# Patient Record
Sex: MALE | Race: WHITE | NOT HISPANIC OR LATINO | Employment: FULL TIME | URBAN - NONMETROPOLITAN AREA
[De-identification: names, ages, dates, MRNs, and addresses within clinical notes are randomized per-mention and may not be internally consistent; named-entity substitution may affect disease eponyms.]

---

## 2023-03-07 PROBLEM — J32.9 CHRONIC SINUSITIS: Status: ACTIVE | Noted: 2023-03-07

## 2023-03-07 PROBLEM — L23.7 CONTACT DERMATITIS DUE TO POISON IVY: Status: ACTIVE | Noted: 2023-03-07

## 2023-03-07 PROBLEM — L70.0 ACNE VULGARIS: Status: ACTIVE | Noted: 2023-03-07

## 2023-03-07 PROBLEM — J35.02 CHRONIC ADENOIDITIS: Status: ACTIVE | Noted: 2023-03-07

## 2023-03-07 PROBLEM — L25.5 CONTACT DERMATITIS DUE TO PLANT: Status: ACTIVE | Noted: 2023-03-07

## 2023-03-07 PROBLEM — J30.9 ALLERGIC RHINITIS: Status: ACTIVE | Noted: 2023-03-07

## 2023-03-07 PROBLEM — M25.569 KNEE PAIN: Status: ACTIVE | Noted: 2023-03-07

## 2023-03-07 PROBLEM — B08.1 MOLLUSCUM CONTAGIOSUM: Status: ACTIVE | Noted: 2023-03-07
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
## 2023-03-07 PROBLEM — M54.50 ACUTE LOW BACK PAIN: Status: ACTIVE | Noted: 2023-03-07

## 2023-03-07 PROBLEM — J02.9 SORE THROAT: Status: ACTIVE | Noted: 2023-03-07

## 2023-03-07 PROBLEM — R51.9 HEADACHE: Status: ACTIVE | Noted: 2023-03-07

## 2023-03-07 PROBLEM — J34.89 CONCHA BULLOSA: Status: ACTIVE | Noted: 2023-03-07

## 2023-03-07 PROBLEM — S61.219A LACERATION OF FINGER: Status: ACTIVE | Noted: 2023-03-07

## 2023-03-07 PROBLEM — R09.82 POSTNASAL DRIP: Status: ACTIVE | Noted: 2023-03-07

## 2023-03-07 PROBLEM — G44.209 MUSCLE TENSION HEADACHE: Status: ACTIVE | Noted: 2023-03-07

## 2023-03-07 PROBLEM — B07.8 COMMON WART: Status: ACTIVE | Noted: 2023-03-07

## 2023-03-07 PROBLEM — J30.1 ACUTE ALLERGIC RHINITIS DUE TO POLLEN: Status: ACTIVE | Noted: 2023-03-07

## 2023-03-07 RX ORDER — AZELASTINE 1 MG/ML
2 SPRAY, METERED NASAL 2 TIMES DAILY
COMMUNITY
Start: 2022-05-13

## 2023-03-07 RX ORDER — BENZOCAINE .13; .15; .5; 2 G/100G; G/100G; G/100G; G/100G
2 GEL ORAL DAILY
COMMUNITY
Start: 2022-05-13

## 2023-03-07 RX ORDER — EPINEPHRINE 0.3 MG/.3ML
0.3 INJECTION SUBCUTANEOUS
COMMUNITY
Start: 2022-05-13

## 2023-03-10 ENCOUNTER — OFFICE VISIT (OUTPATIENT)
Dept: PRIMARY CARE | Facility: CLINIC | Age: 23
End: 2023-03-10
Payer: COMMERCIAL

## 2023-03-10 VITALS
HEART RATE: 79 BPM | WEIGHT: 164.4 LBS | BODY MASS INDEX: 21.69 KG/M2 | SYSTOLIC BLOOD PRESSURE: 122 MMHG | DIASTOLIC BLOOD PRESSURE: 73 MMHG

## 2023-03-10 DIAGNOSIS — Z11.3 ROUTINE SCREENING FOR STI (SEXUALLY TRANSMITTED INFECTION): ICD-10-CM

## 2023-03-10 DIAGNOSIS — Z00.00 HEALTH MAINTENANCE EXAMINATION: Primary | ICD-10-CM

## 2023-03-10 PROCEDURE — 87491 CHLMYD TRACH DNA AMP PROBE: CPT

## 2023-03-10 PROCEDURE — 99395 PREV VISIT EST AGE 18-39: CPT | Performed by: FAMILY MEDICINE

## 2023-03-10 PROCEDURE — 87591 N.GONORRHOEAE DNA AMP PROB: CPT

## 2023-03-10 PROCEDURE — 1036F TOBACCO NON-USER: CPT | Performed by: FAMILY MEDICINE

## 2023-03-10 PROCEDURE — 87661 TRICHOMONAS VAGINALIS AMPLIF: CPT

## 2023-03-10 ASSESSMENT — ENCOUNTER SYMPTOMS
FEVER: 0
TROUBLE SWALLOWING: 0
HEMATURIA: 0
DIARRHEA: 0
BLOOD IN STOOL: 0
SEIZURES: 0
JOINT SWELLING: 0
COLOR CHANGE: 0
CONFUSION: 0
PALPITATIONS: 0
NERVOUS/ANXIOUS: 0
SHORTNESS OF BREATH: 0
DIFFICULTY URINATING: 0
UNEXPECTED WEIGHT CHANGE: 0
CONSTIPATION: 0
APPETITE CHANGE: 0

## 2023-03-10 NOTE — PROGRESS NOTES
Subjective   Patient ID: Calvin Monroy is a 22 y.o. male who presents for No chief complaint on file..  HPI  Patient would like an STI test today.  He states that he was with a new partner about 3 weeks ago and would like testing. He denies any symptoms.   He states that he sometimes uses condoms but not consistently.   Denies dysuria, increased frequency, hematuria, discharge, burning sensation, or puritis.     Patient also states that possibly had a cold sore or pimple on upper lip that has since went away.  Denies any pain.     Review of Systems   Constitutional:  Negative for appetite change, fever and unexpected weight change.   HENT:  Negative for congestion and trouble swallowing.    Eyes:  Negative for visual disturbance.   Respiratory:  Negative for shortness of breath.    Cardiovascular:  Negative for chest pain, palpitations and leg swelling.   Gastrointestinal:  Negative for blood in stool, constipation and diarrhea.   Genitourinary:  Negative for difficulty urinating and hematuria.   Musculoskeletal:  Negative for gait problem and joint swelling.   Skin:  Negative for color change.   Allergic/Immunologic: Negative for immunocompromised state.   Neurological:  Negative for seizures and syncope.   Psychiatric/Behavioral:  Negative for confusion and suicidal ideas. The patient is not nervous/anxious.        Objective   /73   Pulse 79   Wt 74.6 kg (164 lb 6.4 oz)   BMI 21.69 kg/m²     Physical Exam  Constitutional:       General: He is not in acute distress.     Appearance: Normal appearance. He is not ill-appearing.   HENT:      Head: Normocephalic and atraumatic.      Right Ear: Tympanic membrane normal.      Left Ear: Tympanic membrane normal.      Nose: Nose normal.      Mouth/Throat:      Mouth: Mucous membranes are moist.   Eyes:      Pupils: Pupils are equal, round, and reactive to light.   Cardiovascular:      Rate and Rhythm: Normal rate and regular rhythm.      Heart sounds: No murmur  heard.     No friction rub. No gallop.   Pulmonary:      Effort: Pulmonary effort is normal.      Breath sounds: Normal breath sounds.   Abdominal:      General: Abdomen is flat. There is no distension.      Palpations: Abdomen is soft.      Tenderness: There is no abdominal tenderness. There is no guarding.      Hernia: No hernia is present.   Musculoskeletal:         General: Normal range of motion.   Skin:     General: Skin is warm and dry.   Neurological:      General: No focal deficit present.      Mental Status: He is alert. Mental status is at baseline.      Cranial Nerves: No cranial nerve deficit.      Motor: No weakness.      Gait: Gait normal.   Psychiatric:         Mood and Affect: Mood normal.         Behavior: Behavior normal.         Thought Content: Thought content normal.         Judgment: Judgment normal.           Assessment/Plan   Problem List Items Addressed This Visit    None  Visit Diagnoses       Health maintenance examination    -  Primary    Routine screening for STI (sexually transmitted infection)

## 2023-03-11 LAB
CHLAMYDIA TRACH., AMPLIFIED: NEGATIVE
N. GONORRHEA, AMPLIFIED: NEGATIVE

## 2023-03-12 LAB — TRICHOMONAS VAGINALIS: NEGATIVE

## 2024-01-10 ENCOUNTER — HOSPITAL ENCOUNTER (EMERGENCY)
Facility: HOSPITAL | Age: 24
Discharge: HOME | End: 2024-01-10
Attending: EMERGENCY MEDICINE
Payer: MEDICARE

## 2024-01-10 ENCOUNTER — APPOINTMENT (OUTPATIENT)
Dept: RADIOLOGY | Facility: HOSPITAL | Age: 24
End: 2024-01-10
Payer: MEDICARE

## 2024-01-10 VITALS
RESPIRATION RATE: 18 BRPM | OXYGEN SATURATION: 100 % | TEMPERATURE: 97.3 F | HEIGHT: 72 IN | SYSTOLIC BLOOD PRESSURE: 131 MMHG | WEIGHT: 170 LBS | BODY MASS INDEX: 23.03 KG/M2 | DIASTOLIC BLOOD PRESSURE: 79 MMHG | HEART RATE: 71 BPM

## 2024-01-10 DIAGNOSIS — M62.830 PARASPINAL MUSCLE SPASM: Primary | ICD-10-CM

## 2024-01-10 PROCEDURE — 99285 EMERGENCY DEPT VISIT HI MDM: CPT | Mod: 25 | Performed by: EMERGENCY MEDICINE

## 2024-01-10 PROCEDURE — 72128 CT CHEST SPINE W/O DYE: CPT | Mod: FOREIGN READ | Performed by: RADIOLOGY

## 2024-01-10 PROCEDURE — 72128 CT CHEST SPINE W/O DYE: CPT | Mod: FR

## 2024-01-10 PROCEDURE — 72125 CT NECK SPINE W/O DYE: CPT

## 2024-01-10 PROCEDURE — 72125 CT NECK SPINE W/O DYE: CPT | Performed by: RADIOLOGY

## 2024-01-10 ASSESSMENT — COLUMBIA-SUICIDE SEVERITY RATING SCALE - C-SSRS
6. HAVE YOU EVER DONE ANYTHING, STARTED TO DO ANYTHING, OR PREPARED TO DO ANYTHING TO END YOUR LIFE?: NO
1. IN THE PAST MONTH, HAVE YOU WISHED YOU WERE DEAD OR WISHED YOU COULD GO TO SLEEP AND NOT WAKE UP?: NO
2. HAVE YOU ACTUALLY HAD ANY THOUGHTS OF KILLING YOURSELF?: NO

## 2024-01-10 ASSESSMENT — PAIN DESCRIPTION - DESCRIPTORS: DESCRIPTORS: ACHING

## 2024-01-10 ASSESSMENT — LIFESTYLE VARIABLES
EVER HAD A DRINK FIRST THING IN THE MORNING TO STEADY YOUR NERVES TO GET RID OF A HANGOVER: NO
EVER FELT BAD OR GUILTY ABOUT YOUR DRINKING: NO
REASON UNABLE TO ASSESS: NO
HAVE YOU EVER FELT YOU SHOULD CUT DOWN ON YOUR DRINKING: NO
HAVE PEOPLE ANNOYED YOU BY CRITICIZING YOUR DRINKING: NO

## 2024-01-10 ASSESSMENT — PAIN - FUNCTIONAL ASSESSMENT: PAIN_FUNCTIONAL_ASSESSMENT: 0-10

## 2024-01-10 ASSESSMENT — PAIN DESCRIPTION - LOCATION: LOCATION: NECK

## 2024-01-10 ASSESSMENT — PAIN DESCRIPTION - ORIENTATION: ORIENTATION: POSTERIOR

## 2024-01-10 ASSESSMENT — PAIN DESCRIPTION - PAIN TYPE: TYPE: ACUTE PAIN

## 2024-01-10 ASSESSMENT — PAIN SCALES - GENERAL: PAINLEVEL_OUTOF10: 5 - MODERATE PAIN
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
## 2024-06-21 ENCOUNTER — OFFICE VISIT (OUTPATIENT)
Dept: PRIMARY CARE | Facility: CLINIC | Age: 24
End: 2024-06-21
Payer: COMMERCIAL

## 2024-06-21 ENCOUNTER — TELEPHONE (OUTPATIENT)
Dept: PRIMARY CARE | Facility: CLINIC | Age: 24
End: 2024-06-21

## 2024-06-21 VITALS
SYSTOLIC BLOOD PRESSURE: 128 MMHG | WEIGHT: 165 LBS | DIASTOLIC BLOOD PRESSURE: 78 MMHG | BODY MASS INDEX: 22.38 KG/M2 | OXYGEN SATURATION: 98 % | HEART RATE: 92 BPM

## 2024-06-21 DIAGNOSIS — L23.7 POISON IVY DERMATITIS: Primary | ICD-10-CM

## 2024-06-21 PROCEDURE — 1036F TOBACCO NON-USER: CPT

## 2024-06-21 PROCEDURE — 99213 OFFICE O/P EST LOW 20 MIN: CPT

## 2024-06-21 RX ORDER — PREDNISONE 20 MG/1
40 TABLET ORAL DAILY
Qty: 10 TABLET | Refills: 0 | Status: SHIPPED | OUTPATIENT
Start: 2024-06-21 | End: 2024-06-26

## 2024-06-21 RX ORDER — TRIAMCINOLONE ACETONIDE 1 MG/G
OINTMENT TOPICAL 2 TIMES DAILY PRN
Qty: 30 G | Refills: 0 | Status: SHIPPED | OUTPATIENT
Start: 2024-06-21 | End: 2025-06-21

## 2024-06-21 NOTE — TELEPHONE ENCOUNTER
Needs appt to get script for poison ivy- he has not been in for over a year, Vinnie said tomorrow is okay

## 2024-06-21 NOTE — PROGRESS NOTES
Subjective   Patient ID: Calvin Monroy is a 23 y.o. male who presents for Poison Kath.  HPI  Calvin presents for poison ivy   -A day or two ago the rash started   -Has been around poison ivy, is allergic to it   -Has the rash on his L leg   -It is itchy   -Spreading up his legs   -No fever or chills   -Tried ivy dry   -No one else has the rash at home   -No changes in soaps, lotions, or laundry detergent  -No recent travel   -Has used prednisone in the past and it has helped       Past Surgical History:   Procedure Laterality Date    ADENOIDECTOMY  04/15/2014    Adenoidectomy    OTHER SURGICAL HISTORY  03/21/2022    Myringotomy with tube placement    OTHER SURGICAL HISTORY  07/29/2014    Tympanoplasty    TONSILLECTOMY  04/15/2014    Tonsillectomy      Past Medical History:   Diagnosis Date    Central perforation of tympanic membrane, unspecified ear 10/24/2013    Central perforation of tympanic membrane    Conductive hearing loss, unilateral, right ear, with unrestricted hearing on the contralateral side 04/15/2014    Conductive hearing loss in right ear    Other acute sinusitis 02/02/2016    Other acute sinusitis    Otitis media, unspecified, unspecified ear     Ear infection    Personal history of other diseases of the respiratory system 04/03/2017    History of influenza    Snoring     Snoring    Unspecified hearing loss, unspecified ear     Hearing problem     Social History     Tobacco Use    Smoking status: Never    Smokeless tobacco: Never   Vaping Use    Vaping status: Every Day    Substances: Nicotine   Substance Use Topics    Alcohol use: Yes     Alcohol/week: 6.0 - 12.0 standard drinks of alcohol     Types: 6 - 12 Cans of beer per week    Drug use: Not Currently     Comment: Quit marijuana a few months ago        Review of Systems  10 point review of systems performed and is negative except as noted in the HPI.      Current Outpatient Medications:     EPINEPHrine (Auvi-Q) 0.3 mg/0.3 mL injection  syringe, Inject 0.3 mL (0.3 mg) as directed. As Directed, Disp: , Rfl:     azelastine (Astelin) 137 mcg (0.1 %) nasal spray, Administer 2 sprays into each nostril 2 times a day., Disp: , Rfl:     budesonide (Rhinocort AQ) 32 mcg/actuation nasal spray, Administer 2 sprays into each nostril once daily., Disp: , Rfl:     predniSONE (Deltasone) 20 mg tablet, Take 2 tablets (40 mg) by mouth once daily for 5 days., Disp: 10 tablet, Rfl: 0    triamcinolone (Kenalog) 0.1 % ointment, Apply topically 2 times a day as needed for rash., Disp: 30 g, Rfl: 0     Objective   /78   Pulse 92   Wt 74.8 kg (165 lb)   SpO2 98%   BMI 22.38 kg/m²     Physical Exam  Constitutional:       General: He is not in acute distress.     Appearance: Normal appearance. He is not toxic-appearing.   HENT:      Head: Normocephalic and atraumatic.   Cardiovascular:      Rate and Rhythm: Normal rate and regular rhythm.      Heart sounds: Normal heart sounds.   Pulmonary:      Effort: Pulmonary effort is normal.      Breath sounds: Normal breath sounds. No wheezing, rhonchi or rales.   Skin:     Findings: Rash present.      Comments: Erythematous papules on L leg - most seen on lower leg but a few appear on thigh. No additional papules seen on R leg or arms.    Neurological:      Mental Status: He is alert and oriented to person, place, and time.         Assessment/Plan   Problem List Items Addressed This Visit    None  Visit Diagnoses       Poison ivy dermatitis    -  Primary    Relevant Medications    -predniSONE (Deltasone) 20 mg tablet    -gave triamcinolone (Kenalog) 0.1 % ointment as well, patient encounters poison ivy often           Discussed at visit any disease processes that were of concern as well as the risks, benefits and instructions on any new medication provided. Patient (and/or caretaker of patient if present) stated all questions were answered, and they voiced understanding of instructions.

## 2025-03-07 ENCOUNTER — OFFICE VISIT (OUTPATIENT)
Dept: PRIMARY CARE | Facility: CLINIC | Age: 25
End: 2025-03-07
Payer: COMMERCIAL

## 2025-03-07 VITALS
WEIGHT: 171 LBS | OXYGEN SATURATION: 98 % | BODY MASS INDEX: 22.66 KG/M2 | SYSTOLIC BLOOD PRESSURE: 132 MMHG | TEMPERATURE: 97.8 F | DIASTOLIC BLOOD PRESSURE: 82 MMHG | HEART RATE: 90 BPM | HEIGHT: 73 IN

## 2025-03-07 DIAGNOSIS — H69.91 DYSFUNCTION OF RIGHT EUSTACHIAN TUBE: Primary | ICD-10-CM

## 2025-03-07 PROCEDURE — 99213 OFFICE O/P EST LOW 20 MIN: CPT

## 2025-03-07 PROCEDURE — 3008F BODY MASS INDEX DOCD: CPT

## 2025-03-07 PROCEDURE — 1036F TOBACCO NON-USER: CPT

## 2025-03-07 ASSESSMENT — PATIENT HEALTH QUESTIONNAIRE - PHQ9
1. LITTLE INTEREST OR PLEASURE IN DOING THINGS: NOT AT ALL
SUM OF ALL RESPONSES TO PHQ9 QUESTIONS 1 AND 2: 0
2. FEELING DOWN, DEPRESSED OR HOPELESS: NOT AT ALL

## 2025-03-07 NOTE — PROGRESS NOTES
Subjective   Patient ID: Calvin Monroy is a 24 y.o. male who presents for Earache (1 day no fever no cough or sore throat ).  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 As Directed, Disp: , Rfl:    Past Surgical History:   Procedure Laterality Date    ADENOIDECTOMY  04/15/2014    Adenoidectomy    OTHER SURGICAL HISTORY  03/21/2022    Myringotomy with tube placement    OTHER SURGICAL HISTORY  07/29/2014    Tympanoplasty    TONSILLECTOMY  04/15/2014    Tonsillectomy      Past Medical History:   Diagnosis Date    Central perforation of tympanic membrane, unspecified ear 10/24/2013    Central perforation of tympanic membrane    Conductive hearing loss, unilateral, right ear, with unrestricted hearing on the contralateral side 04/15/2014    Conductive hearing loss in right ear    Other acute sinusitis 02/02/2016    Other acute sinusitis    Otitis media, unspecified, unspecified ear     Ear infection    Personal history of other diseases of the respiratory system 04/03/2017    History of influenza    Snoring     Snoring    Unspecified hearing loss, unspecified ear     Hearing problem     Social History     Tobacco Use    Smoking status: Never    Smokeless tobacco: Never   Vaping Use    Vaping status: Every Day    Substances: Nicotine   Substance Use Topics    Alcohol use: Yes     Alcohol/week: 6.0 - 12.0 standard drinks of alcohol     Types: 6 - 12 Cans of beer per week    Drug use: Not Currently     Comment: Quit marijuana a few months ago      Family History  "  Problem Relation Name Age of Onset    Other (hearing problem) Other grandmother       Review of Systems  10 point ROS negative except as otherwise noted in the HPI.      Objective   /82   Pulse 90   Temp 36.6 °C (97.8 °F) (Oral)   Ht 1.854 m (6' 1\")   Wt 77.6 kg (171 lb)   SpO2 98%   BMI 22.56 kg/m²    Physical Exam  Constitutional:       General: He is not in acute distress.     Appearance: Normal appearance. He is not ill-appearing or toxic-appearing.   HENT:      Head: Normocephalic and atraumatic.      Right Ear: Ear canal and external ear normal.      Left Ear: Tympanic membrane, ear canal and external ear normal.      Ears:      Comments: Air fluid levels behind R TM and tympanic sclerosis     Nose: Nose normal. No congestion or rhinorrhea.      Mouth/Throat:      Mouth: Mucous membranes are moist.      Pharynx: Oropharynx is clear. No oropharyngeal exudate or posterior oropharyngeal erythema.   Eyes:      Conjunctiva/sclera: Conjunctivae normal.      Pupils: Pupils are equal, round, and reactive to light.   Neck:      Vascular: No carotid bruit.   Cardiovascular:      Rate and Rhythm: Normal rate and regular rhythm.      Pulses: Normal pulses.      Heart sounds: Normal heart sounds. No murmur heard.  Pulmonary:      Effort: Pulmonary effort is normal.      Breath sounds: Normal breath sounds. No wheezing, rhonchi or rales.   Abdominal:      General: Bowel sounds are normal. There is no distension.      Palpations: Abdomen is soft. There is no mass.      Tenderness: There is no abdominal tenderness. There is no guarding or rebound.   Musculoskeletal:         General: Normal range of motion.      Cervical back: Normal range of motion and neck supple. No tenderness.      Right lower leg: No edema.      Left lower leg: No edema.   Lymphadenopathy:      Cervical: No cervical adenopathy.   Skin:     General: Skin is warm and dry.      Findings: No rash.   Neurological:      Mental Status: He is alert " and oriented to person, place, and time.   Psychiatric:         Mood and Affect: Mood normal.         Behavior: Behavior normal.           Assessment/Plan   Problem List Items Addressed This Visit    None  Visit Diagnoses       Dysfunction of right eustachian tube    -  Primary  - Pt with R ear pain since yesterday. Mild, has not needed to take anything for pain.   - No URI sx, no fevers.   - on exam, some tympanic sclerosis (did have tubes) and air fluid levels on the R. L ear normal.   - suspect ETD. Continue po daily allergy pill, add flonase, given some maneuvers he can try.  - given education on s/sx of infection and instructed to call if not improving.            Discussed at visit any disease processes that were of concern as well as the risks, benefits and instructions on any new medication provided. Patient (and/or caretaker of patient if present) stated all questions were answered, and they voiced understanding of instructions.     Debbie Rodrigez PA-C

## 2025-03-07 NOTE — PATIENT INSTRUCTIONS
It looks like you just have some fluid behind your eardrum causing some of that popping.  Try adding daily nasal steroid like flonase or generic and see if that helps. There are some other things you can try in the handout.  Any fevers, worsening pain, drainage, let me know

## 2025-03-10 ENCOUNTER — APPOINTMENT (OUTPATIENT)
Dept: PRIMARY CARE | Facility: CLINIC | Age: 25
End: 2025-03-10
Payer: COMMERCIAL

## 2025-05-28 ENCOUNTER — APPOINTMENT (OUTPATIENT)
Dept: PRIMARY CARE | Facility: CLINIC | Age: 25
End: 2025-05-28
Payer: COMMERCIAL

## 2025-05-28 VITALS
OXYGEN SATURATION: 98 % | HEART RATE: 78 BPM | SYSTOLIC BLOOD PRESSURE: 132 MMHG | DIASTOLIC BLOOD PRESSURE: 68 MMHG | WEIGHT: 179 LBS | BODY MASS INDEX: 23.62 KG/M2

## 2025-05-28 DIAGNOSIS — L65.9 HAIR LOSS: Primary | ICD-10-CM

## 2025-05-28 PROCEDURE — 1036F TOBACCO NON-USER: CPT

## 2025-05-28 PROCEDURE — 99213 OFFICE O/P EST LOW 20 MIN: CPT

## 2025-05-28 ASSESSMENT — PATIENT HEALTH QUESTIONNAIRE - PHQ9
2. FEELING DOWN, DEPRESSED OR HOPELESS: NOT AT ALL
SUM OF ALL RESPONSES TO PHQ9 QUESTIONS 1 AND 2: 0
1. LITTLE INTEREST OR PLEASURE IN DOING THINGS: NOT AT ALL

## 2025-05-28 NOTE — PATIENT INSTRUCTIONS
Dermatology offices:      Dermatology - [place], [place] - Call the referral line [phone]    [facility] - [place]- [phone], [place] [phone], [place] [phone] (and many more locations)  *can get people in more quickly*    [facility] - [place] [phone], [place] - [phone]   *can get people in more quickly*

## 2025-05-28 NOTE — PROGRESS NOTES
Subjective   Patient ID: Calvin Monroy is a 24 y.o. male who presents for hair loss for 3-6 months.  HPI  Calvin presents for hair loss for 3-6 months  -Every time he showers it coats the shower  -Finds hair around   -Patch on top of head  -No family history of hair loss   -Scalp is sometimes itchy   -Not around chemicals  -Always has used head and shoulders, now using bare a week or 2 ago   -More stressed out lately  -Sleeps 6-7 hours nightly   -No weight changes, appetite is the same   -No constipation or diarrhea   -Fatigued more than usual, tired all day   -Hands are always cold   -No change in laundry detergent, using tide free and clear  -No new exposures he can think of       Surgical History[1]   Medical History[2]  Social History[3]     Review of Systems  10 point review of systems performed and is negative except as noted in the HPI.    Current Medications[4]     Objective   /68   Pulse 78   Wt 81.2 kg (179 lb)   SpO2 98%   BMI 23.62 kg/m²     Physical Exam  Vitals reviewed.   Constitutional:       Appearance: Normal appearance.   Skin:     Comments: On the top of his scalp, close to midline, there is an area of hear that appears more thin, it is in a linear distrubution. No other areas of hair loss were observed. Scalp is not erythematous, dry or flaking.    Neurological:      Mental Status: He is alert.         Assessment & Plan  Hair loss  Discussed checking labs, if normal then recommend he see dermatology for a scalp biopsy   Did discuss rogaine as a treatment option but would like to get to the etiology first   Orders:    CBC and Auto Differential    Tsh With Reflex To Free T4 If Abnormal    Iron and TIBC    Ferritin    Vitamin D 25-Hydroxy,Total (for eval of Vitamin D levels)          Discussed at visit any disease processes that were of concern as well as the risks, benefits and instructions on any new medication provided. Patient (and/or caretaker of patient if present) stated all  questions were answered, and they voiced understanding of instructions.      Bibiana Rosenthal PA-C       [1]   Past Surgical History:  Procedure Laterality Date    ADENOIDECTOMY  04/15/2014    Adenoidectomy    OTHER SURGICAL HISTORY  03/21/2022    Myringotomy with tube placement    OTHER SURGICAL HISTORY  07/29/2014    Tympanoplasty    TONSILLECTOMY  04/15/2014    Tonsillectomy   [2]   Past Medical History:  Diagnosis Date    Central perforation of tympanic membrane, unspecified ear 10/24/2013    Central perforation of tympanic membrane    Conductive hearing loss, unilateral, right ear, with unrestricted hearing on the contralateral side 04/15/2014    Conductive hearing loss in right ear    Other acute sinusitis 02/02/2016    Other acute sinusitis    Otitis media, unspecified, unspecified ear     Ear infection    Personal history of other diseases of the respiratory system 04/03/2017    History of influenza    Snoring     Snoring    Unspecified hearing loss, unspecified ear     Hearing problem   [3]   Social History  Tobacco Use    Smoking status: Never    Smokeless tobacco: Never   Vaping Use    Vaping status: Every Day    Substances: Nicotine   Substance Use Topics    Alcohol use: Yes     Alcohol/week: 6.0 - 12.0 standard drinks of alcohol     Types: 6 - 12 Cans of beer per week    Drug use: Not Currently     Comment: Quit marijuana a few months ago   [4]   Current Outpatient Medications:     EPINEPHrine (Auvi-Q) 0.3 mg/0.3 mL injection syringe, Inject 0.3 mL (0.3 mg) as directed. As Directed (Patient not taking: Reported on 5/28/2025), Disp: , Rfl:

## 2025-05-29 LAB
25(OH)D3+25(OH)D2 SERPL-MCNC: 26 NG/ML (ref 30–100)
BASOPHILS # BLD AUTO: 48 CELLS/UL (ref 0–200)
BASOPHILS NFR BLD AUTO: 0.8 %
EOSINOPHIL # BLD AUTO: 90 CELLS/UL (ref 15–500)
EOSINOPHIL NFR BLD AUTO: 1.5 %
ERYTHROCYTE [DISTWIDTH] IN BLOOD BY AUTOMATED COUNT: 11.8 % (ref 11–15)
FERRITIN SERPL-MCNC: 90 NG/ML (ref 38–380)
HCT VFR BLD AUTO: 44.6 % (ref 38.5–50)
HGB BLD-MCNC: 15 G/DL (ref 13.2–17.1)
IRON SATN MFR SERPL: 25 % (CALC) (ref 20–48)
IRON SERPL-MCNC: 114 MCG/DL (ref 50–195)
LYMPHOCYTES # BLD AUTO: 1530 CELLS/UL (ref 850–3900)
LYMPHOCYTES NFR BLD AUTO: 25.5 %
MCH RBC QN AUTO: 32.3 PG (ref 27–33)
MCHC RBC AUTO-ENTMCNC: 33.6 G/DL (ref 32–36)
MCV RBC AUTO: 96.1 FL (ref 80–100)
MONOCYTES # BLD AUTO: 492 CELLS/UL (ref 200–950)
MONOCYTES NFR BLD AUTO: 8.2 %
NEUTROPHILS # BLD AUTO: 3840 CELLS/UL (ref 1500–7800)
NEUTROPHILS NFR BLD AUTO: 64 %
PLATELET # BLD AUTO: 322 THOUSAND/UL (ref 140–400)
PMV BLD REES-ECKER: 9.5 FL (ref 7.5–12.5)
RBC # BLD AUTO: 4.64 MILLION/UL (ref 4.2–5.8)
TIBC SERPL-MCNC: 455 MCG/DL (CALC) (ref 250–425)
TSH SERPL-ACNC: 1.4 MIU/L (ref 0.4–4.5)
WBC # BLD AUTO: 6 THOUSAND/UL (ref 3.8–10.8)

## 2025-06-05 ENCOUNTER — TELEPHONE (OUTPATIENT)
Dept: PRIMARY CARE | Facility: CLINIC | Age: 25
End: 2025-06-05
Payer: COMMERCIAL

## 2025-06-27 ENCOUNTER — OFFICE VISIT (OUTPATIENT)
Dept: PRIMARY CARE | Facility: CLINIC | Age: 25
End: 2025-06-27
Payer: COMMERCIAL

## 2025-06-27 VITALS
DIASTOLIC BLOOD PRESSURE: 80 MMHG | WEIGHT: 176 LBS | BODY MASS INDEX: 23.22 KG/M2 | HEART RATE: 65 BPM | OXYGEN SATURATION: 97 % | SYSTOLIC BLOOD PRESSURE: 138 MMHG

## 2025-06-27 DIAGNOSIS — B37.0 THRUSH: ICD-10-CM

## 2025-06-27 DIAGNOSIS — K14.8 TONGUE DISCOLORATION: Primary | ICD-10-CM

## 2025-06-27 DIAGNOSIS — F17.290 VAPING NICOTINE DEPENDENCE, TOBACCO PRODUCT: ICD-10-CM

## 2025-06-27 DIAGNOSIS — B37.0 THRUSH: Primary | ICD-10-CM

## 2025-06-27 PROCEDURE — 1036F TOBACCO NON-USER: CPT

## 2025-06-27 PROCEDURE — 99213 OFFICE O/P EST LOW 20 MIN: CPT

## 2025-06-27 RX ORDER — CLOTRIMAZOLE 10 MG/1
10 LOZENGE ORAL
Qty: 50 TROCHE | Refills: 0 | Status: SHIPPED | OUTPATIENT
Start: 2025-06-27 | End: 2025-06-27 | Stop reason: ALTCHOICE

## 2025-06-27 ASSESSMENT — PATIENT HEALTH QUESTIONNAIRE - PHQ9
SUM OF ALL RESPONSES TO PHQ9 QUESTIONS 1 AND 2: 0
2. FEELING DOWN, DEPRESSED OR HOPELESS: NOT AT ALL
1. LITTLE INTEREST OR PLEASURE IN DOING THINGS: NOT AT ALL

## 2025-06-27 NOTE — PROGRESS NOTES
Subjective   Patient ID: Calvin Monroy is a 24 y.o. male who presents for Thrush (One month ).  HPI    Thrush was there before monoxidil   After he brushed his teeth there eis less of it   Smells bad, tastes bad    For hair   Monoxidil     Was prescribed fluconazole by dermatology  Seemed to help   Saw the dentist, stopped fluoconazole then did nystatin mouth wash, did not hlep  Prescribed magic mouthwash     Only in mouth     Started drinking less   No travel, no new foods     Used nicotine pouches for 1-2 weeks   Vaping currently   No cigarettes ever     Mouth does not hurt  No sore throat         No fever, no chills, no night sweats, no weight changes     Surgical History[1]   Medical History[2]  Social History[3]     Review of Systems  10 point review of systems performed and is negative except as noted in the HPI.    Current Medications[4]     Objective   /80   Pulse 65   Wt 79.8 kg (176 lb)   SpO2 97%   BMI 23.22 kg/m²     Physical Exam    Assessment & Plan  Thrush    Orders:  •  HIV 1/2 Antigen/Antibody Screen with Reflex to Confirmation  •  Syphilis Screen with Reflex  •  clotrimazole (Mycelex) 10 mg natalie; Use 1 tablet (10 mg) in the mouth or throat 5 times a day for 10 days.          Discussed at visit any disease processes that were of concern as well as the risks, benefits and instructions on any new medication provided. Patient (and/or caretaker of patient if present) stated all questions were answered, and they voiced understanding of instructions.      Bibiana Rosenthal PA-C         [1]  Past Surgical History:  Procedure Laterality Date   • ADENOIDECTOMY  04/15/2014    Adenoidectomy   • OTHER SURGICAL HISTORY  03/21/2022    Myringotomy with tube placement   • OTHER SURGICAL HISTORY  07/29/2014    Tympanoplasty   • TONSILLECTOMY  04/15/2014    Tonsillectomy   [2]  Past Medical History:  Diagnosis Date   • Central perforation of tympanic membrane, unspecified ear 10/24/2013    Central  perforation of tympanic membrane   • Conductive hearing loss, unilateral, right ear, with unrestricted hearing on the contralateral side 04/15/2014    Conductive hearing loss in right ear   • Other acute sinusitis 02/02/2016    Other acute sinusitis   • Otitis media, unspecified, unspecified ear     Ear infection   • Personal history of other diseases of the respiratory system 04/03/2017    History of influenza   • Snoring     Snoring   • Unspecified hearing loss, unspecified ear     Hearing problem   [3]  Social History  Tobacco Use   • Smoking status: Never   • Smokeless tobacco: Never   Vaping Use   • Vaping status: Every Day   • Substances: Nicotine   Substance Use Topics   • Alcohol use: Yes     Alcohol/week: 6.0 - 12.0 standard drinks of alcohol     Types: 6 - 12 Cans of beer per week   • Drug use: Not Currently     Comment: Quit marijuana a few months ago   [4]    Current Outpatient Medications:   •  EPINEPHrine (Auvi-Q) 0.3 mg/0.3 mL injection syringe, Inject 0.3 mL (0.3 mg) as directed. As Directed (Patient not taking: Reported on 6/27/2025), Disp: , Rfl:    use: Not Currently     Comment: Quit marijuana a few months ago   [4]   Current Outpatient Medications:     EPINEPHrine (Auvi-Q) 0.3 mg/0.3 mL injection syringe, Inject 0.3 mL (0.3 mg) as directed. As Directed (Patient not taking: Reported on 6/27/2025), Disp: , Rfl:

## 2025-07-14 DIAGNOSIS — K14.8 TONGUE DISCOLORATION: Primary | ICD-10-CM

## 2025-07-14 RX ORDER — CLOTRIMAZOLE 10 MG/1
10 LOZENGE ORAL
Qty: 50 TROCHE | Refills: 0 | Status: SHIPPED | OUTPATIENT
Start: 2025-07-14 | End: 2025-07-24

## 2025-08-12 DIAGNOSIS — L23.7 POISON IVY DERMATITIS: Primary | ICD-10-CM

## 2025-08-12 RX ORDER — METHYLPREDNISOLONE 4 MG/1
TABLET ORAL
Qty: 21 TABLET | Refills: 0 | Status: SHIPPED | OUTPATIENT
Start: 2025-08-12 | End: 2025-08-18

## 2025-09-01 ENCOUNTER — OFFICE VISIT (OUTPATIENT)
Dept: URGENT CARE | Age: 25
End: 2025-09-01
Payer: COMMERCIAL

## 2025-09-01 VITALS
BODY MASS INDEX: 22.43 KG/M2 | DIASTOLIC BLOOD PRESSURE: 76 MMHG | WEIGHT: 170 LBS | SYSTOLIC BLOOD PRESSURE: 129 MMHG | TEMPERATURE: 97.7 F | HEART RATE: 71 BPM | RESPIRATION RATE: 20 BRPM | OXYGEN SATURATION: 97 %

## 2025-09-01 DIAGNOSIS — H66.91 RIGHT ACUTE OTITIS MEDIA: Primary | ICD-10-CM

## 2025-09-01 PROCEDURE — 99203 OFFICE O/P NEW LOW 30 MIN: CPT

## 2025-09-01 PROCEDURE — 1036F TOBACCO NON-USER: CPT

## 2025-09-01 RX ORDER — AMOXICILLIN 875 MG/1
875 TABLET, COATED ORAL 2 TIMES DAILY
Qty: 14 TABLET | Refills: 0 | Status: SHIPPED | OUTPATIENT
Start: 2025-09-01 | End: 2025-09-08